# Patient Record
Sex: MALE | Race: WHITE | Employment: FULL TIME | URBAN - METROPOLITAN AREA
[De-identification: names, ages, dates, MRNs, and addresses within clinical notes are randomized per-mention and may not be internally consistent; named-entity substitution may affect disease eponyms.]

---

## 2020-03-31 ENCOUNTER — TELEPHONE (OUTPATIENT)
Dept: FAMILY MEDICINE CLINIC | Age: 55
End: 2020-03-31

## 2020-04-01 ENCOUNTER — TELEPHONE (OUTPATIENT)
Dept: FAMILY MEDICINE CLINIC | Age: 55
End: 2020-04-01

## 2020-04-01 NOTE — TELEPHONE ENCOUNTER
Outbound call placed to patient. No answer. Left message for patient to give us a call back regarding upcoming appointment. Patients new patient appointment needs to be rescheduled to a later date due to COVID-19 pandemic.     [facility] can Reschedule